# Patient Record
Sex: MALE | Race: WHITE | HISPANIC OR LATINO | Employment: FULL TIME | URBAN - METROPOLITAN AREA
[De-identification: names, ages, dates, MRNs, and addresses within clinical notes are randomized per-mention and may not be internally consistent; named-entity substitution may affect disease eponyms.]

---

## 2022-01-20 ENCOUNTER — IMMUNIZATION (OUTPATIENT)
Dept: PHARMACY | Facility: CLINIC | Age: 32
End: 2022-01-20
Payer: COMMERCIAL

## 2022-01-20 DIAGNOSIS — Z23 NEED FOR VACCINATION: Primary | ICD-10-CM

## 2023-03-24 ENCOUNTER — OFFICE VISIT (OUTPATIENT)
Dept: PRIMARY CARE CLINIC | Facility: CLINIC | Age: 33
End: 2023-03-24
Payer: COMMERCIAL

## 2023-03-24 VITALS
BODY MASS INDEX: 34.45 KG/M2 | WEIGHT: 246.06 LBS | OXYGEN SATURATION: 98 % | HEIGHT: 71 IN | SYSTOLIC BLOOD PRESSURE: 116 MMHG | HEART RATE: 64 BPM | DIASTOLIC BLOOD PRESSURE: 64 MMHG

## 2023-03-24 DIAGNOSIS — R42 VERTIGO: Primary | ICD-10-CM

## 2023-03-24 PROCEDURE — 1160F RVW MEDS BY RX/DR IN RCRD: CPT | Mod: CPTII,S$GLB,, | Performed by: STUDENT IN AN ORGANIZED HEALTH CARE EDUCATION/TRAINING PROGRAM

## 2023-03-24 PROCEDURE — 1159F MED LIST DOCD IN RCRD: CPT | Mod: CPTII,S$GLB,, | Performed by: STUDENT IN AN ORGANIZED HEALTH CARE EDUCATION/TRAINING PROGRAM

## 2023-03-24 PROCEDURE — 3008F BODY MASS INDEX DOCD: CPT | Mod: CPTII,S$GLB,, | Performed by: STUDENT IN AN ORGANIZED HEALTH CARE EDUCATION/TRAINING PROGRAM

## 2023-03-24 PROCEDURE — 99999 PR PBB SHADOW E&M-EST. PATIENT-LVL IV: CPT | Mod: PBBFAC,,, | Performed by: STUDENT IN AN ORGANIZED HEALTH CARE EDUCATION/TRAINING PROGRAM

## 2023-03-24 PROCEDURE — 3078F PR MOST RECENT DIASTOLIC BLOOD PRESSURE < 80 MM HG: ICD-10-PCS | Mod: CPTII,S$GLB,, | Performed by: STUDENT IN AN ORGANIZED HEALTH CARE EDUCATION/TRAINING PROGRAM

## 2023-03-24 PROCEDURE — 99203 PR OFFICE/OUTPT VISIT, NEW, LEVL III, 30-44 MIN: ICD-10-PCS | Mod: S$GLB,,, | Performed by: STUDENT IN AN ORGANIZED HEALTH CARE EDUCATION/TRAINING PROGRAM

## 2023-03-24 PROCEDURE — 1160F PR REVIEW ALL MEDS BY PRESCRIBER/CLIN PHARMACIST DOCUMENTED: ICD-10-PCS | Mod: CPTII,S$GLB,, | Performed by: STUDENT IN AN ORGANIZED HEALTH CARE EDUCATION/TRAINING PROGRAM

## 2023-03-24 PROCEDURE — 99999 PR PBB SHADOW E&M-EST. PATIENT-LVL IV: ICD-10-PCS | Mod: PBBFAC,,, | Performed by: STUDENT IN AN ORGANIZED HEALTH CARE EDUCATION/TRAINING PROGRAM

## 2023-03-24 PROCEDURE — 99203 OFFICE O/P NEW LOW 30 MIN: CPT | Mod: S$GLB,,, | Performed by: STUDENT IN AN ORGANIZED HEALTH CARE EDUCATION/TRAINING PROGRAM

## 2023-03-24 PROCEDURE — 1159F PR MEDICATION LIST DOCUMENTED IN MEDICAL RECORD: ICD-10-PCS | Mod: CPTII,S$GLB,, | Performed by: STUDENT IN AN ORGANIZED HEALTH CARE EDUCATION/TRAINING PROGRAM

## 2023-03-24 PROCEDURE — 3008F PR BODY MASS INDEX (BMI) DOCUMENTED: ICD-10-PCS | Mod: CPTII,S$GLB,, | Performed by: STUDENT IN AN ORGANIZED HEALTH CARE EDUCATION/TRAINING PROGRAM

## 2023-03-24 PROCEDURE — 3074F SYST BP LT 130 MM HG: CPT | Mod: CPTII,S$GLB,, | Performed by: STUDENT IN AN ORGANIZED HEALTH CARE EDUCATION/TRAINING PROGRAM

## 2023-03-24 PROCEDURE — 3078F DIAST BP <80 MM HG: CPT | Mod: CPTII,S$GLB,, | Performed by: STUDENT IN AN ORGANIZED HEALTH CARE EDUCATION/TRAINING PROGRAM

## 2023-03-24 PROCEDURE — 3074F PR MOST RECENT SYSTOLIC BLOOD PRESSURE < 130 MM HG: ICD-10-PCS | Mod: CPTII,S$GLB,, | Performed by: STUDENT IN AN ORGANIZED HEALTH CARE EDUCATION/TRAINING PROGRAM

## 2023-03-24 RX ORDER — PREDNISONE 20 MG/1
20 TABLET ORAL 2 TIMES DAILY
COMMUNITY
Start: 2023-03-20 | End: 2024-02-22

## 2023-03-24 RX ORDER — SERTRALINE HYDROCHLORIDE 100 MG/1
100 TABLET, FILM COATED ORAL
COMMUNITY
Start: 2022-09-27

## 2023-03-27 NOTE — PROGRESS NOTES
Subjective:       Patient ID: Shan Boudreaux is a 32 y.o. male.   Chief Complaint: Dizziness and Establish Care        Pt presents today to discuss dizziness.  Sxs started a few months ago and happen about every day, off and on throughout the day  Room spins around him  No falls  No sob, palpitations.  No headaches  No recent illnesses  Zyrtec prn w/ illness  Due for eye exam  No recent medication cxs      Review of Systems   Constitutional:  Negative for fever.   Respiratory:  Negative for shortness of breath.    Cardiovascular:  Negative for chest pain.   Genitourinary:  Negative for dysuria, flank pain and urgency.   Musculoskeletal:  Negative for back pain.   Integumentary:  Negative for rash.   Neurological:  Negative for weakness.            Objective:        Physical Exam  HENT:      Head: Normocephalic and atraumatic.      Nose: Nose normal.      Mouth/Throat:      Mouth: Mucous membranes are moist.      Pharynx: Oropharynx is clear.   Eyes:      Extraocular Movements: Extraocular movements intact.      Conjunctiva/sclera: Conjunctivae normal.      Pupils: Pupils are equal, round, and reactive to light.   Pulmonary:      Effort: Pulmonary effort is normal.   Musculoskeletal:         General: No swelling. Normal range of motion.      Cervical back: Normal range of motion.      Right lower leg: No edema.      Left lower leg: No edema.   Skin:     General: Skin is warm.      Findings: No lesion or rash.   Neurological:      General: No focal deficit present.      Mental Status: He is alert and oriented to person, place, and time.      Motor: No weakness.         Assessment:         Problem List Items Addressed This Visit    None  Visit Diagnoses       Vertigo    -  Primary    Relevant Orders    Ambulatory referral/consult to ENT                Plan:         1. Vertigo  -     Ambulatory referral/consult to ENT; Future; Expected date: 03/31/2023    ENT referral placed.  Advised doing daily antihistamine  and flonase  RTC if sxs worsen    Natacha Thompson MD

## 2023-05-26 ENCOUNTER — OFFICE VISIT (OUTPATIENT)
Dept: PRIMARY CARE CLINIC | Facility: CLINIC | Age: 33
End: 2023-05-26
Payer: COMMERCIAL

## 2023-05-26 ENCOUNTER — LAB VISIT (OUTPATIENT)
Dept: LAB | Facility: HOSPITAL | Age: 33
End: 2023-05-26
Attending: STUDENT IN AN ORGANIZED HEALTH CARE EDUCATION/TRAINING PROGRAM
Payer: COMMERCIAL

## 2023-05-26 VITALS
DIASTOLIC BLOOD PRESSURE: 72 MMHG | HEART RATE: 63 BPM | OXYGEN SATURATION: 98 % | SYSTOLIC BLOOD PRESSURE: 122 MMHG | BODY MASS INDEX: 34.35 KG/M2 | WEIGHT: 245.38 LBS | HEIGHT: 71 IN

## 2023-05-26 DIAGNOSIS — Z00.00 ANNUAL PHYSICAL EXAM: ICD-10-CM

## 2023-05-26 DIAGNOSIS — R74.01 TRANSAMINITIS: Primary | ICD-10-CM

## 2023-05-26 DIAGNOSIS — Z00.00 ANNUAL PHYSICAL EXAM: Primary | ICD-10-CM

## 2023-05-26 LAB
ALBUMIN SERPL BCP-MCNC: 4.1 G/DL (ref 3.5–5.2)
ALP SERPL-CCNC: 90 U/L (ref 55–135)
ALT SERPL W/O P-5'-P-CCNC: 92 U/L (ref 10–44)
ANION GAP SERPL CALC-SCNC: 11 MMOL/L (ref 8–16)
AST SERPL-CCNC: 47 U/L (ref 10–40)
BASOPHILS # BLD AUTO: 0.09 K/UL (ref 0–0.2)
BASOPHILS NFR BLD: 1.3 % (ref 0–1.9)
BILIRUB SERPL-MCNC: 0.4 MG/DL (ref 0.1–1)
BUN SERPL-MCNC: 16 MG/DL (ref 6–20)
CALCIUM SERPL-MCNC: 9.9 MG/DL (ref 8.7–10.5)
CHLORIDE SERPL-SCNC: 105 MMOL/L (ref 95–110)
CHOLEST SERPL-MCNC: 196 MG/DL (ref 120–199)
CHOLEST/HDLC SERPL: 4.3 {RATIO} (ref 2–5)
CO2 SERPL-SCNC: 23 MMOL/L (ref 23–29)
CREAT SERPL-MCNC: 0.9 MG/DL (ref 0.5–1.4)
DIFFERENTIAL METHOD: ABNORMAL
EOSINOPHIL # BLD AUTO: 0.2 K/UL (ref 0–0.5)
EOSINOPHIL NFR BLD: 3.4 % (ref 0–8)
ERYTHROCYTE [DISTWIDTH] IN BLOOD BY AUTOMATED COUNT: 13 % (ref 11.5–14.5)
EST. GFR  (NO RACE VARIABLE): >60 ML/MIN/1.73 M^2
ESTIMATED AVG GLUCOSE: 103 MG/DL (ref 68–131)
GLUCOSE SERPL-MCNC: 94 MG/DL (ref 70–110)
HBA1C MFR BLD: 5.2 % (ref 4–5.6)
HCT VFR BLD AUTO: 45.2 % (ref 40–54)
HCV AB SERPL QL IA: NORMAL
HDLC SERPL-MCNC: 46 MG/DL (ref 40–75)
HDLC SERPL: 23.5 % (ref 20–50)
HGB BLD-MCNC: 14.3 G/DL (ref 14–18)
HIV 1+2 AB+HIV1 P24 AG SERPL QL IA: NORMAL
IMM GRANULOCYTES # BLD AUTO: 0.03 K/UL (ref 0–0.04)
IMM GRANULOCYTES NFR BLD AUTO: 0.4 % (ref 0–0.5)
LDLC SERPL CALC-MCNC: 125.4 MG/DL (ref 63–159)
LYMPHOCYTES # BLD AUTO: 2.1 K/UL (ref 1–4.8)
LYMPHOCYTES NFR BLD: 29 % (ref 18–48)
MCH RBC QN AUTO: 28.9 PG (ref 27–31)
MCHC RBC AUTO-ENTMCNC: 31.6 G/DL (ref 32–36)
MCV RBC AUTO: 92 FL (ref 82–98)
MONOCYTES # BLD AUTO: 0.6 K/UL (ref 0.3–1)
MONOCYTES NFR BLD: 7.8 % (ref 4–15)
NEUTROPHILS # BLD AUTO: 4.1 K/UL (ref 1.8–7.7)
NEUTROPHILS NFR BLD: 58.1 % (ref 38–73)
NONHDLC SERPL-MCNC: 150 MG/DL
NRBC BLD-RTO: 0 /100 WBC
PLATELET # BLD AUTO: 286 K/UL (ref 150–450)
PMV BLD AUTO: 11.2 FL (ref 9.2–12.9)
POTASSIUM SERPL-SCNC: 4.3 MMOL/L (ref 3.5–5.1)
PROT SERPL-MCNC: 7.4 G/DL (ref 6–8.4)
RBC # BLD AUTO: 4.94 M/UL (ref 4.6–6.2)
SODIUM SERPL-SCNC: 139 MMOL/L (ref 136–145)
TRIGL SERPL-MCNC: 123 MG/DL (ref 30–150)
TSH SERPL DL<=0.005 MIU/L-ACNC: 2.45 UIU/ML (ref 0.4–4)
WBC # BLD AUTO: 7.07 K/UL (ref 3.9–12.7)

## 2023-05-26 PROCEDURE — 3008F BODY MASS INDEX DOCD: CPT | Mod: CPTII,S$GLB,, | Performed by: STUDENT IN AN ORGANIZED HEALTH CARE EDUCATION/TRAINING PROGRAM

## 2023-05-26 PROCEDURE — 99395 PREV VISIT EST AGE 18-39: CPT | Mod: S$GLB,,, | Performed by: STUDENT IN AN ORGANIZED HEALTH CARE EDUCATION/TRAINING PROGRAM

## 2023-05-26 PROCEDURE — 84443 ASSAY THYROID STIM HORMONE: CPT | Performed by: STUDENT IN AN ORGANIZED HEALTH CARE EDUCATION/TRAINING PROGRAM

## 2023-05-26 PROCEDURE — 1159F PR MEDICATION LIST DOCUMENTED IN MEDICAL RECORD: ICD-10-PCS | Mod: CPTII,S$GLB,, | Performed by: STUDENT IN AN ORGANIZED HEALTH CARE EDUCATION/TRAINING PROGRAM

## 2023-05-26 PROCEDURE — 3078F DIAST BP <80 MM HG: CPT | Mod: CPTII,S$GLB,, | Performed by: STUDENT IN AN ORGANIZED HEALTH CARE EDUCATION/TRAINING PROGRAM

## 2023-05-26 PROCEDURE — 83036 HEMOGLOBIN GLYCOSYLATED A1C: CPT | Performed by: STUDENT IN AN ORGANIZED HEALTH CARE EDUCATION/TRAINING PROGRAM

## 2023-05-26 PROCEDURE — 86803 HEPATITIS C AB TEST: CPT | Performed by: STUDENT IN AN ORGANIZED HEALTH CARE EDUCATION/TRAINING PROGRAM

## 2023-05-26 PROCEDURE — 3008F PR BODY MASS INDEX (BMI) DOCUMENTED: ICD-10-PCS | Mod: CPTII,S$GLB,, | Performed by: STUDENT IN AN ORGANIZED HEALTH CARE EDUCATION/TRAINING PROGRAM

## 2023-05-26 PROCEDURE — 80053 COMPREHEN METABOLIC PANEL: CPT | Performed by: STUDENT IN AN ORGANIZED HEALTH CARE EDUCATION/TRAINING PROGRAM

## 2023-05-26 PROCEDURE — 1159F MED LIST DOCD IN RCRD: CPT | Mod: CPTII,S$GLB,, | Performed by: STUDENT IN AN ORGANIZED HEALTH CARE EDUCATION/TRAINING PROGRAM

## 2023-05-26 PROCEDURE — 99395 PR PREVENTIVE VISIT,EST,18-39: ICD-10-PCS | Mod: S$GLB,,, | Performed by: STUDENT IN AN ORGANIZED HEALTH CARE EDUCATION/TRAINING PROGRAM

## 2023-05-26 PROCEDURE — 36415 COLL VENOUS BLD VENIPUNCTURE: CPT | Performed by: STUDENT IN AN ORGANIZED HEALTH CARE EDUCATION/TRAINING PROGRAM

## 2023-05-26 PROCEDURE — 99999 PR PBB SHADOW E&M-EST. PATIENT-LVL IV: CPT | Mod: PBBFAC,,, | Performed by: STUDENT IN AN ORGANIZED HEALTH CARE EDUCATION/TRAINING PROGRAM

## 2023-05-26 PROCEDURE — 3074F SYST BP LT 130 MM HG: CPT | Mod: CPTII,S$GLB,, | Performed by: STUDENT IN AN ORGANIZED HEALTH CARE EDUCATION/TRAINING PROGRAM

## 2023-05-26 PROCEDURE — 1160F PR REVIEW ALL MEDS BY PRESCRIBER/CLIN PHARMACIST DOCUMENTED: ICD-10-PCS | Mod: CPTII,S$GLB,, | Performed by: STUDENT IN AN ORGANIZED HEALTH CARE EDUCATION/TRAINING PROGRAM

## 2023-05-26 PROCEDURE — 87389 HIV-1 AG W/HIV-1&-2 AB AG IA: CPT | Performed by: STUDENT IN AN ORGANIZED HEALTH CARE EDUCATION/TRAINING PROGRAM

## 2023-05-26 PROCEDURE — 80061 LIPID PANEL: CPT | Performed by: STUDENT IN AN ORGANIZED HEALTH CARE EDUCATION/TRAINING PROGRAM

## 2023-05-26 PROCEDURE — 3078F PR MOST RECENT DIASTOLIC BLOOD PRESSURE < 80 MM HG: ICD-10-PCS | Mod: CPTII,S$GLB,, | Performed by: STUDENT IN AN ORGANIZED HEALTH CARE EDUCATION/TRAINING PROGRAM

## 2023-05-26 PROCEDURE — 99999 PR PBB SHADOW E&M-EST. PATIENT-LVL IV: ICD-10-PCS | Mod: PBBFAC,,, | Performed by: STUDENT IN AN ORGANIZED HEALTH CARE EDUCATION/TRAINING PROGRAM

## 2023-05-26 PROCEDURE — 85025 COMPLETE CBC W/AUTO DIFF WBC: CPT | Performed by: STUDENT IN AN ORGANIZED HEALTH CARE EDUCATION/TRAINING PROGRAM

## 2023-05-26 PROCEDURE — 1160F RVW MEDS BY RX/DR IN RCRD: CPT | Mod: CPTII,S$GLB,, | Performed by: STUDENT IN AN ORGANIZED HEALTH CARE EDUCATION/TRAINING PROGRAM

## 2023-05-26 PROCEDURE — 3074F PR MOST RECENT SYSTOLIC BLOOD PRESSURE < 130 MM HG: ICD-10-PCS | Mod: CPTII,S$GLB,, | Performed by: STUDENT IN AN ORGANIZED HEALTH CARE EDUCATION/TRAINING PROGRAM

## 2023-05-26 RX ORDER — DIAZEPAM 5 MG/1
TABLET ORAL
COMMUNITY
Start: 2023-03-31

## 2023-05-26 NOTE — PROGRESS NOTES
SUBJECTIVE     Chief Complaint   Patient presents with    Annual Exam    Establish Care       HPI  Shan Boudreaux is a very pleasant 32 y.o. male with medical diagnoses as listed in the medical history and problem list that presents for annual exam.    Family, social, surgical Hx reviewed     Health Maintenance         Date Due Completion Date    Hepatitis C Screening Never done ---    Pneumococcal Vaccines (Age 0-64) (1 - PCV) Never done ---    HIV Screening Never done ---    TETANUS VACCINE Never done ---    COVID-19 Vaccine (1) 01/20/2022 1/20/2022    Influenza Vaccine (Season Ended) 09/01/2023 ---            PAST MEDICAL HISTORY:  Past Medical History:   Diagnosis Date    Allergy        PAST SURGICAL HISTORY:  History reviewed. No pertinent surgical history.    SOCIAL HISTORY:  Social History     Socioeconomic History    Marital status:    Occupational History     Employer: OTHER   Tobacco Use    Smoking status: Former     Packs/day: 0.25     Years: 10.00     Pack years: 2.50     Types: Cigarettes    Smokeless tobacco: Never    Tobacco comments:     smoked on and off for 10 years. Quit in 2018.   Substance and Sexual Activity    Alcohol use: Yes     Alcohol/week: 3.0 standard drinks     Types: 3 Cans of beer per week     Comment: social drinker    Drug use: No    Sexual activity: Yes     Partners: Female       FAMILY HISTORY:  Family History   Problem Relation Age of Onset    Hypertension Mother     Colon polyps Father     Diabetes type II Paternal Grandfather     Hypertension Maternal Aunt     Prostate cancer Neg Hx        ALLERGIES AND MEDICATIONS: updated and reviewed.  Review of patient's allergies indicates:  No Known Allergies  Current Outpatient Medications   Medication Sig Dispense Refill    cetirizine (ZYRTEC) 10 MG tablet TAKE 1 TABLET BY MOUTH EVERY DAY 90 tablet 1    diazePAM (VALIUM) 5 MG tablet TAKE 1/2 TO 1 TABLET BY MOUTH EVERY 6 HOURS      fluticasone (FLONASE) 50 mcg/actuation  "nasal spray USE 2 SPRAYS BY EACH NARE ROUTE ONCE DAILY. 1 Bottle 8    sertraline (ZOLOFT) 100 MG tablet Take 100 mg by mouth.      predniSONE (DELTASONE) 20 MG tablet Take 20 mg by mouth 2 (two) times daily.      tramadol (ULTRAM) 50 mg tablet Take 1 tablet (50 mg total) by mouth every 6 (six) hours as needed for Pain. 60 tablet 0     No current facility-administered medications for this visit.       ROS  Review of Systems   Constitutional:  Negative for fever and weight loss.   Respiratory:  Negative for cough and shortness of breath.    Cardiovascular:  Negative for chest pain and palpitations.   Gastrointestinal:  Negative for abdominal pain, constipation, diarrhea, nausea and vomiting.   Genitourinary:  Negative for dysuria.   Musculoskeletal:  Negative for back pain and joint pain.   Skin:  Negative for rash.   Neurological:  Negative for dizziness, weakness and headaches.   Psychiatric/Behavioral:  Negative for depression. The patient is not nervous/anxious.        OBJECTIVE     Physical Exam  Vitals:    05/26/23 0821   BP: 122/72   Pulse: 63    Body mass index is 34.22 kg/m².  Weight: 111.3 kg (245 lb 6 oz)   Height: 5' 11" (180.3 cm)     Physical Exam  HENT:      Head: Normocephalic and atraumatic.      Nose: Nose normal.      Mouth/Throat:      Mouth: Mucous membranes are moist.      Pharynx: Oropharynx is clear.   Eyes:      Extraocular Movements: Extraocular movements intact.      Conjunctiva/sclera: Conjunctivae normal.      Pupils: Pupils are equal, round, and reactive to light.   Cardiovascular:      Rate and Rhythm: Normal rate and regular rhythm.   Pulmonary:      Effort: Pulmonary effort is normal.      Breath sounds: Normal breath sounds.   Musculoskeletal:         General: No swelling. Normal range of motion.      Cervical back: Normal range of motion.      Right lower leg: No edema.      Left lower leg: No edema.   Skin:     General: Skin is warm.      Findings: No lesion or rash.   Neurological: "      General: No focal deficit present.      Mental Status: He is alert and oriented to person, place, and time.      Motor: No weakness.             ASSESSMENT     32 y.o. male with     1. Annual physical exam    2. BMI 34.0-34.9,adult        PLAN:     1. Annual physical exam  -     Hemoglobin A1C; Future; Expected date: 05/26/2023  -     HIV 1/2 Ag/Ab (4th Gen); Future; Expected date: 05/26/2023  -     Hepatitis C Antibody; Future; Expected date: 05/26/2023  -     TSH; Future; Expected date: 05/26/2023  -     Lipid Panel; Future; Expected date: 05/26/2023  -     Comprehensive Metabolic Panel; Future; Expected date: 05/26/2023  -     CBC Auto Differential; Future; Expected date: 05/26/2023    2. BMI 34.0-34.9,adult  -     Hemoglobin A1C; Future; Expected date: 05/26/2023        Discussed age and gender appropriate screenings at this visit and encouraged a healthy diet low in simple carbohydrates, and increased physical activity.  Counseled on medically appropriate vaccines based on age and current health condition.  Screening test reviewed and discussed with patient.       RTC in 1 year      Natacha Thompson MD  05/26/2023 8:26 AM

## 2023-05-31 ENCOUNTER — PATIENT MESSAGE (OUTPATIENT)
Dept: PRIMARY CARE CLINIC | Facility: CLINIC | Age: 33
End: 2023-05-31
Payer: COMMERCIAL

## 2023-06-01 ENCOUNTER — PATIENT MESSAGE (OUTPATIENT)
Dept: PRIMARY CARE CLINIC | Facility: CLINIC | Age: 33
End: 2023-06-01
Payer: COMMERCIAL

## 2023-06-22 ENCOUNTER — LAB VISIT (OUTPATIENT)
Dept: LAB | Facility: HOSPITAL | Age: 33
End: 2023-06-22
Attending: STUDENT IN AN ORGANIZED HEALTH CARE EDUCATION/TRAINING PROGRAM
Payer: COMMERCIAL

## 2023-06-22 DIAGNOSIS — R74.01 TRANSAMINITIS: ICD-10-CM

## 2023-06-22 LAB
ALBUMIN SERPL BCP-MCNC: 4.1 G/DL (ref 3.5–5.2)
ALP SERPL-CCNC: 99 U/L (ref 55–135)
ALT SERPL W/O P-5'-P-CCNC: 96 U/L (ref 10–44)
ANION GAP SERPL CALC-SCNC: 10 MMOL/L (ref 8–16)
AST SERPL-CCNC: 43 U/L (ref 10–40)
BILIRUB SERPL-MCNC: 0.5 MG/DL (ref 0.1–1)
BUN SERPL-MCNC: 13 MG/DL (ref 6–20)
CALCIUM SERPL-MCNC: 9.9 MG/DL (ref 8.7–10.5)
CHLORIDE SERPL-SCNC: 106 MMOL/L (ref 95–110)
CO2 SERPL-SCNC: 25 MMOL/L (ref 23–29)
CREAT SERPL-MCNC: 1 MG/DL (ref 0.5–1.4)
EST. GFR  (NO RACE VARIABLE): >60 ML/MIN/1.73 M^2
GLUCOSE SERPL-MCNC: 97 MG/DL (ref 70–110)
POTASSIUM SERPL-SCNC: 4.5 MMOL/L (ref 3.5–5.1)
PROT SERPL-MCNC: 7.4 G/DL (ref 6–8.4)
SODIUM SERPL-SCNC: 141 MMOL/L (ref 136–145)

## 2023-06-22 PROCEDURE — 36415 COLL VENOUS BLD VENIPUNCTURE: CPT | Performed by: STUDENT IN AN ORGANIZED HEALTH CARE EDUCATION/TRAINING PROGRAM

## 2023-06-22 PROCEDURE — 80053 COMPREHEN METABOLIC PANEL: CPT | Performed by: STUDENT IN AN ORGANIZED HEALTH CARE EDUCATION/TRAINING PROGRAM

## 2023-06-29 ENCOUNTER — PATIENT MESSAGE (OUTPATIENT)
Dept: PRIMARY CARE CLINIC | Facility: CLINIC | Age: 33
End: 2023-06-29
Payer: COMMERCIAL

## 2023-06-29 DIAGNOSIS — R74.8 ELEVATED LIVER ENZYMES: Primary | ICD-10-CM

## 2023-06-29 NOTE — TELEPHONE ENCOUNTER
One of the most common reasons is due to too much fat consumption in diet.  This is why we recommend decreasing red meat, excess dairy, alcohol, and fried food in the diet.

## 2023-07-21 ENCOUNTER — HOSPITAL ENCOUNTER (OUTPATIENT)
Dept: RADIOLOGY | Facility: HOSPITAL | Age: 33
Discharge: HOME | End: 2023-07-21
Attending: STUDENT IN AN ORGANIZED HEALTH CARE EDUCATION/TRAINING PROGRAM
Payer: COMMERCIAL

## 2023-07-21 DIAGNOSIS — R74.8 ELEVATED LIVER ENZYMES: ICD-10-CM

## 2023-07-21 PROCEDURE — 76705 ECHO EXAM OF ABDOMEN: CPT | Mod: TC

## 2023-07-21 PROCEDURE — 76705 ECHO EXAM OF ABDOMEN: CPT | Mod: 26,,, | Performed by: RADIOLOGY

## 2023-07-21 PROCEDURE — 76705 US ABDOMEN LIMITED_LIVER: ICD-10-PCS | Mod: 26,,, | Performed by: RADIOLOGY

## 2024-02-22 ENCOUNTER — OFFICE VISIT (OUTPATIENT)
Dept: PRIMARY CARE CLINIC | Facility: CLINIC | Age: 34
End: 2024-02-22
Payer: COMMERCIAL

## 2024-02-22 VITALS
SYSTOLIC BLOOD PRESSURE: 118 MMHG | WEIGHT: 259.06 LBS | DIASTOLIC BLOOD PRESSURE: 60 MMHG | BODY MASS INDEX: 36.13 KG/M2 | HEART RATE: 66 BPM | OXYGEN SATURATION: 98 %

## 2024-02-22 DIAGNOSIS — K64.9 HEMORRHOIDS, UNSPECIFIED HEMORRHOID TYPE: ICD-10-CM

## 2024-02-22 DIAGNOSIS — L42 PITYRIASIS ROSEA: Primary | ICD-10-CM

## 2024-02-22 PROCEDURE — 99999 PR PBB SHADOW E&M-EST. PATIENT-LVL IV: CPT | Mod: PBBFAC,,, | Performed by: STUDENT IN AN ORGANIZED HEALTH CARE EDUCATION/TRAINING PROGRAM

## 2024-02-22 PROCEDURE — 99214 OFFICE O/P EST MOD 30 MIN: CPT | Mod: S$GLB,,, | Performed by: STUDENT IN AN ORGANIZED HEALTH CARE EDUCATION/TRAINING PROGRAM

## 2024-02-22 RX ORDER — HYDROXYZINE HYDROCHLORIDE 25 MG/1
25 TABLET, FILM COATED ORAL 3 TIMES DAILY PRN
Qty: 30 TABLET | Refills: 0 | Status: SHIPPED | OUTPATIENT
Start: 2024-02-22

## 2024-02-22 RX ORDER — HYDROCORTISONE 25 MG/G
CREAM TOPICAL 2 TIMES DAILY
Qty: 28 G | Refills: 0 | Status: SHIPPED | OUTPATIENT
Start: 2024-02-22

## 2024-02-22 NOTE — PROGRESS NOTES
INTERNAL MEDICINE SAME DAY PRIMARY CARE VISIT NOTE    Subjective:     Chief Complaint: Rash       Patient ID: Shan Boudreaux is a 33 y.o. male , here today for focused same-day primary care visit.      History of Present Illness  The patient presents for evaluation of multiple medical concerns.    He developed a small rash on his neck about 1.5 weeks ago. He initially thought it was a ringworm. He applied some antifungal cream on it, but ever since then, he has developed a rash on his chest, abdomen, legs, face, and back. The rash is itchy occasionally. He denies any fevers. He denies any changes to laundry detergents or soap. He denies any new medications or supplements. He was sick for 3 days with fatigue, drainage, and stuffy, but no fever. His children do not have a rash.  He has been taking cool showers. He denies any itching. He took Benadryl, which did not help.    He gets hemorrhoids frequently They come and go. He applies over-the-counter Preparation H cream on them. They come in more frequently. He has 3 to 4 bowel movements a day. He drinks a lot of water. They were coming every 4 to 5 days, but now it is every other month. If he does not get backed up until he has hard stools, it shows back up. They pop sometimes. He has had blood sometimes.        Past Medical History:  Past Medical History:   Diagnosis Date    Allergy        Home Medications:  Prior to Admission medications    Medication Sig Start Date End Date Taking? Authorizing Provider   cetirizine (ZYRTEC) 10 MG tablet TAKE 1 TABLET BY MOUTH EVERY DAY 2/14/15  Yes Lisha Treviño MD   diazePAM (VALIUM) 5 MG tablet TAKE 1/2 TO 1 TABLET BY MOUTH EVERY 6 HOURS 3/31/23  Yes Provider, Historical   fluticasone (FLONASE) 50 mcg/actuation nasal spray USE 2 SPRAYS BY EACH NARE ROUTE ONCE DAILY. 3/14/16  Yes Pradeep Bell MD   sertraline (ZOLOFT) 100 MG tablet Take 100 mg by mouth. 9/27/22  Yes Provider, Historical   predniSONE  (DELTASONE) 20 MG tablet Take 20 mg by mouth 2 (two) times daily. 3/20/23 2/22/24  Provider, Historical       Allergies:  Review of patient's allergies indicates:  No Known Allergies    Social History:  Social History     Tobacco Use    Smoking status: Former     Current packs/day: 0.25     Average packs/day: 0.3 packs/day for 10.0 years (2.5 ttl pk-yrs)     Types: Cigarettes    Smokeless tobacco: Never    Tobacco comments:     smoked on and off for 10 years. Quit in 2018.   Substance Use Topics    Alcohol use: Yes     Alcohol/week: 3.0 standard drinks of alcohol     Types: 3 Cans of beer per week     Comment: social drinker    Drug use: No         Review of Systems   Constitutional:  Negative for fatigue and fever.   HENT:  Negative for congestion, rhinorrhea and sore throat.    Eyes:  Negative for pain.   Respiratory:  Negative for cough and shortness of breath.    Gastrointestinal:  Negative for diarrhea and vomiting.   Skin:  Positive for rash.           Objective:   /60 (BP Location: Right arm)   Pulse 66   Wt 117.5 kg (259 lb 0.7 oz)   SpO2 98%   BMI 36.13 kg/m²        General: AAO x3, no apparent distress  HEENT: PERRL, OP clear  CV: RRR, no m/r/g  Pulm: Lungs CTAB, no crackles, no wheezes  Abd: s/NT/ND +BS  Extremities: no c/c/e  Skin: small, flat, ovoid papules over chest, back, abdomen, and arms.    Labs:         Assessment/Plan     Shan Mallory Jr was seen today for rash.    Diagnoses and all orders for this visit:    Pityriasis rosea  -     hydrOXYzine HCL (ATARAX) 25 MG tablet; Take 1 tablet (25 mg total) by mouth 3 (three) times daily as needed for Itching.  Advised OTC sensitive skin daily moisturizer (ex Vanicream or Cerave), lukewarm baths, using humidifier in dry/cold weather, wearing cotton fabrics, and using a mild soap or a non-soap cleanser when washing.    Hemorrhoids, unspecified hemorrhoid type  -     hydrocortisone 2.5 % cream; Apply topically 2 (two) times daily.  Encouraged  increased fiber intake, given list of high fiber foods.  Also encouraged increase in physical activity.  Avoid starchy foods that can worsen constipation.  Rec otc stool softener bid and prn Miralax.            Natacha Thompson MD  Department of Internal Medicine - [facility]    This note was generated with the assistance of ambient listening technology. Verbal consent was obtained by the patient and accompanying visitor(s) for the recording of patient appointment to facilitate this note. I attest to having reviewed and edited the generated note for accuracy, though some syntax or spelling errors may persist. Please contact the author of this note for any clarification.

## 2025-05-23 ENCOUNTER — OFFICE VISIT (OUTPATIENT)
Dept: PRIMARY CARE CLINIC | Facility: CLINIC | Age: 35
End: 2025-05-23
Payer: COMMERCIAL

## 2025-05-23 ENCOUNTER — LAB VISIT (OUTPATIENT)
Dept: LAB | Facility: HOSPITAL | Age: 35
End: 2025-05-23
Attending: STUDENT IN AN ORGANIZED HEALTH CARE EDUCATION/TRAINING PROGRAM
Payer: COMMERCIAL

## 2025-05-23 VITALS
OXYGEN SATURATION: 98 % | WEIGHT: 248 LBS | DIASTOLIC BLOOD PRESSURE: 84 MMHG | BODY MASS INDEX: 34.59 KG/M2 | SYSTOLIC BLOOD PRESSURE: 122 MMHG | HEART RATE: 71 BPM

## 2025-05-23 DIAGNOSIS — Z13.1 SCREENING FOR DIABETES MELLITUS: ICD-10-CM

## 2025-05-23 DIAGNOSIS — Z00.00 ANNUAL PHYSICAL EXAM: Primary | ICD-10-CM

## 2025-05-23 DIAGNOSIS — Z00.00 ANNUAL PHYSICAL EXAM: ICD-10-CM

## 2025-05-23 DIAGNOSIS — S39.012D STRAIN OF LUMBAR REGION, SUBSEQUENT ENCOUNTER: ICD-10-CM

## 2025-05-23 LAB
ABSOLUTE EOSINOPHIL (OHS): 0.41 K/UL
ABSOLUTE MONOCYTE (OHS): 0.63 K/UL (ref 0.3–1)
ABSOLUTE NEUTROPHIL COUNT (OHS): 4.74 K/UL (ref 1.8–7.7)
ALBUMIN SERPL BCP-MCNC: 3.9 G/DL (ref 3.5–5.2)
ALP SERPL-CCNC: 113 UNIT/L (ref 40–150)
ALT SERPL W/O P-5'-P-CCNC: 82 UNIT/L (ref 10–44)
ANION GAP (OHS): 11 MMOL/L (ref 8–16)
AST SERPL-CCNC: 39 UNIT/L (ref 11–45)
BASOPHILS # BLD AUTO: 0.1 K/UL
BASOPHILS NFR BLD AUTO: 1.2 %
BILIRUB SERPL-MCNC: 0.4 MG/DL (ref 0.1–1)
BUN SERPL-MCNC: 14 MG/DL (ref 6–20)
CALCIUM SERPL-MCNC: 9.2 MG/DL (ref 8.7–10.5)
CHLORIDE SERPL-SCNC: 103 MMOL/L (ref 95–110)
CHOLEST SERPL-MCNC: 199 MG/DL (ref 120–199)
CHOLEST/HDLC SERPL: 4.4 {RATIO} (ref 2–5)
CO2 SERPL-SCNC: 23 MMOL/L (ref 23–29)
CREAT SERPL-MCNC: 1 MG/DL (ref 0.5–1.4)
EAG (OHS): 100 MG/DL (ref 68–131)
ERYTHROCYTE [DISTWIDTH] IN BLOOD BY AUTOMATED COUNT: 13 % (ref 11.5–14.5)
GFR SERPLBLD CREATININE-BSD FMLA CKD-EPI: >60 ML/MIN/1.73/M2
GLUCOSE SERPL-MCNC: 82 MG/DL (ref 70–110)
HBA1C MFR BLD: 5.1 % (ref 4–5.6)
HCT VFR BLD AUTO: 45 % (ref 40–54)
HDLC SERPL-MCNC: 45 MG/DL (ref 40–75)
HDLC SERPL: 22.6 % (ref 20–50)
HGB BLD-MCNC: 14.4 GM/DL (ref 14–18)
IMM GRANULOCYTES # BLD AUTO: 0.06 K/UL (ref 0–0.04)
IMM GRANULOCYTES NFR BLD AUTO: 0.7 % (ref 0–0.5)
LDLC SERPL CALC-MCNC: 118.6 MG/DL (ref 63–159)
LYMPHOCYTES # BLD AUTO: 2.43 K/UL (ref 1–4.8)
MCH RBC QN AUTO: 28.9 PG (ref 27–31)
MCHC RBC AUTO-ENTMCNC: 32 G/DL (ref 32–36)
MCV RBC AUTO: 90 FL (ref 82–98)
NONHDLC SERPL-MCNC: 154 MG/DL
NUCLEATED RBC (/100WBC) (OHS): 0 /100 WBC
PLATELET # BLD AUTO: 289 K/UL (ref 150–450)
PMV BLD AUTO: 11 FL (ref 9.2–12.9)
POTASSIUM SERPL-SCNC: 4.7 MMOL/L (ref 3.5–5.1)
PROT SERPL-MCNC: 7.4 GM/DL (ref 6–8.4)
RBC # BLD AUTO: 4.99 M/UL (ref 4.6–6.2)
RELATIVE EOSINOPHIL (OHS): 4.9 %
RELATIVE LYMPHOCYTE (OHS): 29 % (ref 18–48)
RELATIVE MONOCYTE (OHS): 7.5 % (ref 4–15)
RELATIVE NEUTROPHIL (OHS): 56.7 % (ref 38–73)
SODIUM SERPL-SCNC: 137 MMOL/L (ref 136–145)
TRIGL SERPL-MCNC: 177 MG/DL (ref 30–150)
TSH SERPL-ACNC: 2.46 UIU/ML (ref 0.4–4)
WBC # BLD AUTO: 8.37 K/UL (ref 3.9–12.7)

## 2025-05-23 PROCEDURE — 84443 ASSAY THYROID STIM HORMONE: CPT

## 2025-05-23 PROCEDURE — 83036 HEMOGLOBIN GLYCOSYLATED A1C: CPT

## 2025-05-23 PROCEDURE — 80053 COMPREHEN METABOLIC PANEL: CPT

## 2025-05-23 PROCEDURE — 99999 PR PBB SHADOW E&M-EST. PATIENT-LVL III: CPT | Mod: PBBFAC,,, | Performed by: STUDENT IN AN ORGANIZED HEALTH CARE EDUCATION/TRAINING PROGRAM

## 2025-05-23 PROCEDURE — 80061 LIPID PANEL: CPT

## 2025-05-23 PROCEDURE — 85025 COMPLETE CBC W/AUTO DIFF WBC: CPT

## 2025-05-23 PROCEDURE — 36415 COLL VENOUS BLD VENIPUNCTURE: CPT

## 2025-05-23 RX ORDER — MELOXICAM 15 MG/1
15 TABLET ORAL DAILY PRN
Qty: 30 TABLET | Refills: 0 | Status: SHIPPED | OUTPATIENT
Start: 2025-05-23

## 2025-05-23 RX ORDER — CYCLOBENZAPRINE HCL 10 MG
10 TABLET ORAL NIGHTLY PRN
Qty: 30 TABLET | Refills: 0 | Status: SHIPPED | OUTPATIENT
Start: 2025-05-23

## 2025-05-23 NOTE — PROGRESS NOTES
SUBJECTIVE     Chief Complaint   Patient presents with    Annual Exam       HPI  Shan Boudreaux is a very pleasant 34 y.o. male with medical diagnoses as listed in the medical history and problem list that presents for annual exam.    Family, social, surgical Hx reviewed     Health Maintenance         Date Due Completion Date    Hepatitis C Screening Never done ---    Pneumococcal Vaccines (Age 0-64) (1 - PCV) Never done ---    HIV Screening Never done ---    TETANUS VACCINE Never done ---    COVID-19 Vaccine (1) 01/20/2022 1/20/2022    Influenza Vaccine (Season Ended) 09/01/2023 ---          Separate EM concern:    Low back pain:  Low back muscle strain, happening intermittently over the past few yrs but has a current strain   +sciatica of R leg  No leg weaknes  Taking tylenol prn with temp relief    PAST MEDICAL HISTORY:  Past Medical History:   Diagnosis Date    Allergy        PAST SURGICAL HISTORY:  History reviewed. No pertinent surgical history.    SOCIAL HISTORY:  Social History     Socioeconomic History    Marital status:    Occupational History     Employer: OTHER   Tobacco Use    Smoking status: Former     Current packs/day: 0.25     Average packs/day: 0.3 packs/day for 10.0 years (2.5 ttl pk-yrs)     Types: Cigarettes    Smokeless tobacco: Never    Tobacco comments:     smoked on and off for 10 years. Quit in 2018.   Substance and Sexual Activity    Alcohol use: Yes     Alcohol/week: 3.0 standard drinks of alcohol     Types: 3 Cans of beer per week     Comment: social drinker    Drug use: No    Sexual activity: Yes     Partners: Female   Social History Narrative    ** Merged History Encounter **            FAMILY HISTORY:  Family History   Problem Relation Name Age of Onset    Hypertension Mother      Colon polyps Father      Diabetes type II Paternal Grandfather      Hypertension Maternal Aunt      Prostate cancer Neg Hx         ALLERGIES AND MEDICATIONS: updated and reviewed.  Review of  patient's allergies indicates:  No Known Allergies  Current Outpatient Medications   Medication Sig Dispense Refill    cetirizine (ZYRTEC) 10 MG tablet TAKE 1 TABLET BY MOUTH EVERY DAY 90 tablet 1    cyclobenzaprine (FLEXERIL) 10 MG tablet Take 1 tablet (10 mg total) by mouth nightly as needed for Muscle spasms. 30 tablet 0    diazePAM (VALIUM) 5 MG tablet TAKE 1/2 TO 1 TABLET BY MOUTH EVERY 6 HOURS      fluticasone (FLONASE) 50 mcg/actuation nasal spray USE 2 SPRAYS BY EACH NARE ROUTE ONCE DAILY. 1 Bottle 8    hydrocortisone 2.5 % cream Apply topically 2 (two) times daily. 28 g 0    hydrOXYzine HCL (ATARAX) 25 MG tablet Take 1 tablet (25 mg total) by mouth 3 (three) times daily as needed for Itching. 30 tablet 0    meloxicam (MOBIC) 15 MG tablet Take 1 tablet (15 mg total) by mouth daily as needed for Pain. 30 tablet 0    sertraline (ZOLOFT) 100 MG tablet Take 100 mg by mouth.       No current facility-administered medications for this visit.       ROS  Review of Systems   Constitutional:  Negative for fever and weight loss.   Respiratory:  Negative for cough and shortness of breath.    Cardiovascular:  Negative for chest pain and palpitations.   Gastrointestinal:  Negative for abdominal pain, constipation, diarrhea, nausea and vomiting.   Genitourinary:  Negative for dysuria.   Musculoskeletal:  Positive for back pain. Negative for joint pain.   Skin:  Negative for rash.   Neurological:  Negative for dizziness, weakness and headaches.   Psychiatric/Behavioral:  Negative for depression. The patient is not nervous/anxious.          OBJECTIVE     Physical Exam  Vitals:    05/23/25 0936   BP: 122/84   Pulse: 71    Body mass index is 34.59 kg/m².  Weight: 112.5 kg (248 lb 0.3 oz)         Physical Exam  HENT:      Head: Normocephalic and atraumatic.      Nose: Nose normal.      Mouth/Throat:      Mouth: Mucous membranes are moist.      Pharynx: Oropharynx is clear.   Eyes:      Extraocular Movements: Extraocular  movements intact.      Conjunctiva/sclera: Conjunctivae normal.      Pupils: Pupils are equal, round, and reactive to light.   Cardiovascular:      Rate and Rhythm: Normal rate and regular rhythm.   Pulmonary:      Effort: Pulmonary effort is normal.      Breath sounds: Normal breath sounds.   Musculoskeletal:         General: No swelling. Normal range of motion.      Cervical back: Normal range of motion.      Right lower leg: No edema.      Left lower leg: No edema.   Skin:     General: Skin is warm.      Findings: No lesion or rash.   Neurological:      General: No focal deficit present.      Mental Status: He is alert and oriented to person, place, and time.      Motor: No weakness.               ASSESSMENT     34 y.o. male with     1. Annual physical exam    2. Screening for diabetes mellitus    3. Strain of lumbar region, subsequent encounter          PLAN:     1. Annual physical exam  -     TSH; Future; Expected date: 05/23/2025  -     Lipid Panel; Future; Expected date: 05/23/2025  -     Comprehensive Metabolic Panel; Future; Expected date: 05/23/2025  -     CBC Auto Differential; Future; Expected date: 05/23/2025    2. Screening for diabetes mellitus  -     Hemoglobin A1C; Future; Expected date: 05/23/2025    3. Strain of lumbar region, subsequent encounter  -     meloxicam (MOBIC) 15 MG tablet; Take 1 tablet (15 mg total) by mouth daily as needed for Pain.  Dispense: 30 tablet; Refill: 0  -     cyclobenzaprine (FLEXERIL) 10 MG tablet; Take 1 tablet (10 mg total) by mouth nightly as needed for Muscle spasms.  Dispense: 30 tablet; Refill: 0    Short course of muscle relaxers as needed for back spasm.  Apply heating pad to lumbar back for 15 min four times daily.  Return to clinic in 2 weeks if symptoms unimproved or worsening.        Discussed age and gender appropriate screenings at this visit and encouraged a healthy diet low in simple carbohydrates, and increased physical activity.  Counseled on medically  appropriate vaccines based on age and current health condition.  Screening test reviewed and discussed with patient.       RTC in 1 year      Natacha Thompson MD  05/23/2025 8:26 AM

## 2025-05-26 ENCOUNTER — RESULTS FOLLOW-UP (OUTPATIENT)
Dept: PRIMARY CARE CLINIC | Facility: CLINIC | Age: 35
End: 2025-05-26

## 2025-06-24 DIAGNOSIS — S39.012D STRAIN OF LUMBAR REGION, SUBSEQUENT ENCOUNTER: ICD-10-CM

## 2025-06-24 RX ORDER — CYCLOBENZAPRINE HCL 10 MG
TABLET ORAL
Qty: 30 TABLET | Refills: 0 | Status: SHIPPED | OUTPATIENT
Start: 2025-06-24

## 2025-06-24 RX ORDER — MELOXICAM 15 MG/1
15 TABLET ORAL DAILY PRN
Qty: 30 TABLET | Refills: 0 | Status: SHIPPED | OUTPATIENT
Start: 2025-06-24

## 2025-06-24 NOTE — TELEPHONE ENCOUNTER
No care due was identified.  [facility] Embedded Care Due Messages. Reference number: [id number].   6/24/2025 12:15:38 AM CDT